# Patient Record
Sex: FEMALE | URBAN - METROPOLITAN AREA
[De-identification: names, ages, dates, MRNs, and addresses within clinical notes are randomized per-mention and may not be internally consistent; named-entity substitution may affect disease eponyms.]

---

## 2020-03-20 ENCOUNTER — VIRTUAL VISIT (OUTPATIENT)
Dept: FAMILY MEDICINE | Facility: OTHER | Age: 65
End: 2020-03-20

## 2020-03-20 NOTE — PROGRESS NOTES
"Date: 2020 08:49:32  Clinician: Naomy Foster  Clinician NPI: [id number]  Patient: Cassie Santiago  Patient : 1955  [address] [place] [address]  Patient Phone: [phone]  Visit Protocol: URI  Patient Summary:  Cassie is a 64 year old ( : 1955 ) female who initiated a Visit for cold, sinus infection, or influenza. When asked the question \"Please sign me up to receive news, health information and promotions from [employer].\", Cassie responded \"Yes\".    Cassie states her symptoms started gradually 3-6 days ago.   Her symptoms consist of a headache, ear pain, rhinitis, enlarged lymph nodes, facial pain or pressure, myalgia, a sore throat, a cough, nasal congestion, malaise, chills, and tooth pain. She is experiencing difficulty breathing due to nasal congestion but she is not short of breath. Cassie also feels feverish.   Symptom details     Nasal secretions: The color of her mucus is yellow and blood-tinged.    Cough: Cassie coughs a few times an hour and her cough is more bothersome at night. Phlegm comes into her throat when she coughs. She believes her cough is caused by post-nasal drip. The color of the phlegm is yellow and white.     Sore throat: Cassie reports having moderate throat pain (4-6 on a 10 point pain scale), does not have exudate on her tonsils, and can swallow liquids. The lymph nodes in her neck are enlarged. A rash has not appeared on the skin since the sore throat started.     Temperature: Her current temperature is 99.8 degrees Fahrenheit.     Facial pain or pressure: The facial pain or pressure feels worse when bending over or leaning forward.     Headache: She states the headache is moderate (4-6 on a 10 point pain scale).     Tooth pain: The tooth pain is not caused by a cavity, recent dental work, or other mouth problems.      Cassie denies having wheezing. She also denies having recent facial or sinus surgery in the past 60 days, " double sickening (worsening symptoms after initial improvement), and taking antibiotic medication for the symptoms.   Precipitating events  Cassie is not sure if she has been exposed to someone with strep throat. She has not recently been exposed to someone with influenza. Cassie has been in close contact with the following high risk individuals: children under the age of 5 and people with asthma, heart disease or diabetes.   Pertinent COVID-19 (Coronavirus) information  Cassie has not traveled internationally or to the areas where COVID-19 (Coronavirus) is widespread, including cruise ship travel in the last 14 days before the start of her symptoms.   Cassie has not had a close contact with a laboratory-confirmed COVID-19 patient within 14 days of symptom onset. She also has not had a close contact with a suspected COVID-19 patient within 14 days of symptom onset.   Cassie is not a healthcare worker and does not work in a healthcare facility.   Pertinent medical history  Cassie had 2 sinus infections within the past year.   Cassie typically gets a yeast infection when she takes antibiotics. She has used fluconazole (Diflucan) to treat previous yeast infections. 1 dose of fluconazole (Diflucan) has typically been sufficient for symptoms to resolve in the past.   Cassie does not need a return to work/school note.   Weight: 148 lbs   Cassie does not smoke or use smokeless tobacco.   Weight: 148 lbs    MEDICATIONS: aspirin oral, rosuvastatin oral, lisinopril oral, Novolin 70/30 U-100 Insulin subcutaneous, ALLERGIES: NKDA  Clinician Response:  Gina Matthews,   Based on the information you have provided, you do have symptoms that are consistent with Coronavirus (COVID-19).  The coronavirus causes mild to severe respiratory illness with the most common symptoms including fever, cough and difficulty breathing. Unfortunately, many viruses cause similar symptoms and it can be difficult to distinguish between viruses,  especially in mild cases, so we are presuming that anyone with cough or fever has coronavirus at this time.  Coronavirus/COVID-19 has reached the point of community spread in [place], meaning that we are finding the virus in people with no known exposure risk for ramesh the virus. Given the increasing commonness of coronavirus in the community we are no longer testing patients who are not critically ill.  If you are a health care worker, you should refer to your employee health office for instructions about returning to work.  For everyone else who has cough or fever, you should assume you are infected with coronavirus. Accordingly, you should self-quarantine for seven days from the first day your symptoms started OR 72 hours after your cough and fever completely resolve - WHICHEVER is LONGER. You should call if you find increasing shortness of breath, wheezing or sustained fever above 101.5. If you are significantly short of breath or experience chest pain you should call 911 or report to the nearest emergency department for urgent evaluation.    Isolate yourself at home.   Do Not allow any visitors  Do Not go to work or school  Do Not go to [facility],  centers, shopping, or other public places.  Do Not shake hands.  Avoid close contact with others (hugging, kissing).   Protect Others:    Cover Your Mouth and Nose with a mask, disposable tissue or wash cloth to avoid spreading germs to others.  Wash your hands and face frequently with soap and water.   If you develop significant shortness of breath that prevents you from doing normal activities, please call 911 or proceed to the nearest emergency room and alert them immediately that you have been in self-isolation for possible coronavirus.   For more information about COVID19 and options for caring for yourself at home, please visit the CDC website at https://www.cdc.gov/coronavirus/2019-ncov/about/steps-when-sick.htmlFor more options for care at [facility], please visit our website at [email or web address]     Diagnosis: Cough  Diagnosis ICD: R05